# Patient Record
Sex: FEMALE | Employment: OTHER | URBAN - METROPOLITAN AREA
[De-identification: names, ages, dates, MRNs, and addresses within clinical notes are randomized per-mention and may not be internally consistent; named-entity substitution may affect disease eponyms.]

---

## 2017-04-06 NOTE — PATIENT DISCUSSION
(H25.13) Age-related nuclear cataract, bilateral - Assesment : Examination revealed cataract. Mild symptoms. S/P Monovision LASIK - Plan : SMonitor for changes. Advised patient to call our office with decreased vision or increased symptoms. Updated GLRx given today.   RTC in 1 year EXAM/MAC OCT

## 2017-04-06 NOTE — PATIENT DISCUSSION
(H35.371) Puckering of macula, right eye - Assesment : Examination revealed ERM, OCT flat OU. - Plan : Monitor. Advised patient to call our office with decreased vision or increased symptoms. RTC  in 1 year for exam and  MAC OCT, sooner for problems.

## 2019-07-22 ENCOUNTER — CONSULTATION (OUTPATIENT)
Dept: URBAN - METROPOLITAN AREA CLINIC 18 | Facility: CLINIC | Age: 64
End: 2019-07-22

## 2019-07-22 VITALS — SYSTOLIC BLOOD PRESSURE: 130 MMHG | HEIGHT: 55 IN | DIASTOLIC BLOOD PRESSURE: 90 MMHG

## 2019-07-22 NOTE — PATIENT DISCUSSION
I explained to the patient that they have a branch retinal vein occlusion, which is a blockage in one of the small veins in the back of the eye. Discussed the importance of blood pressure control in the prevention of ocular complications.

## 2019-07-22 NOTE — PATIENT DISCUSSION
Based on today’s exam and diagnostic studies, the determination was made for Avastin injection treatment. We will contact the patient's insurance company to do the precertification for the injection.

## 2019-07-24 ASSESSMENT — VISUAL ACUITY
OS_CC: 20/20
OD_CC: 20/30

## 2019-07-24 ASSESSMENT — TONOMETRY
OD_IOP_MMHG: 17
OS_IOP_MMHG: 20

## 2019-11-25 ENCOUNTER — FOLLOW UP (OUTPATIENT)
Dept: URBAN - METROPOLITAN AREA CLINIC 18 | Facility: CLINIC | Age: 64
End: 2019-11-25

## 2019-11-25 NOTE — PATIENT DISCUSSION
No treatment needed today. I will see her again in 2  months for re-evaluation with the possibility of an injection.

## 2019-12-02 ASSESSMENT — VISUAL ACUITY
OS_CC: 20/20-1
OD_CC: 20/25+2

## 2019-12-02 ASSESSMENT — TONOMETRY: OD_IOP_MMHG: 15

## 2020-01-27 ENCOUNTER — FOLLOW UP (OUTPATIENT)
Dept: URBAN - METROPOLITAN AREA CLINIC 18 | Facility: CLINIC | Age: 65
End: 2020-01-27

## 2020-01-27 NOTE — PROCEDURE NOTE: CLINICAL
PROCEDURE NOTE: Avastin () #4 OD. Diagnosis: Branch Retinal Vein Occlusion with Macular Edema. Anesthesia: Topical/Subconjunctival. Prep: Betadine Drops and Betadine Scrub. Intravitreal injection of Avastin 1.25mg/0.05 ml was given. Injection site: 3-4 mm from the limbus. Patient tolerated procedure well. CF vision checked. There were no complications. Post-op instructions given. Lot #: [id number]. Expiration Date: 3/3/2020. Inventory Id: gokul Castelan

## 2020-01-27 NOTE — PATIENT DISCUSSION
I have recommended that she have an injection of Avastin today in the right eye while she is here. I would like to see her on a monthly basis for 2 more. I will likely recommend Focal laser in the future.

## 2020-01-29 ASSESSMENT — TONOMETRY
OD_IOP_MMHG: 16
OS_IOP_MMHG: 16

## 2020-01-29 ASSESSMENT — VISUAL ACUITY
OD_CC: 20/25
OS_CC: 20/20

## 2020-05-11 ENCOUNTER — FOLLOW UP (OUTPATIENT)
Dept: URBAN - METROPOLITAN AREA CLINIC 18 | Facility: CLINIC | Age: 65
End: 2020-05-11

## 2020-05-12 ASSESSMENT — TONOMETRY: OD_IOP_MMHG: 15

## 2020-05-12 ASSESSMENT — VISUAL ACUITY
OS_CC: 20/20
OD_CC: 20/25-1

## 2020-07-27 ENCOUNTER — FOLLOW UP (OUTPATIENT)
Dept: URBAN - METROPOLITAN AREA CLINIC 18 | Facility: CLINIC | Age: 65
End: 2020-07-27

## 2020-07-27 NOTE — PATIENT DISCUSSION
Avastin #7 recommended today for the right eye while she is here today. I have recommended that she return in 1 month for focal laser.

## 2020-07-27 NOTE — PROCEDURE NOTE: CLINICAL
PROCEDURE NOTE: Avastin () #7 OD. Diagnosis: Branch Retinal Vein Occlusion with Macular Edema. Anesthesia: Topical/Subconjunctival. Prep: Betadine Drops and Betadine Scrub. Intravitreal injection of Avastin 1.25mg/0.05 ml was given. Injection site: 3-4 mm from the limbus. Patient tolerated procedure well. CF vision checked. There were no complications. Post-op instructions given. Lot #: [id number]. Expiration Date: 9/15/2020. Inventory Id: gokul Hurley

## 2020-07-29 ASSESSMENT — TONOMETRY
OD_IOP_MMHG: 10
OS_IOP_MMHG: 12

## 2020-07-29 ASSESSMENT — VISUAL ACUITY
OD_CC: 20/20-1
OS_CC: 20/20-1

## 2020-11-23 ENCOUNTER — FOLLOW UP (OUTPATIENT)
Dept: URBAN - METROPOLITAN AREA CLINIC 18 | Facility: CLINIC | Age: 65
End: 2020-11-23

## 2020-11-23 NOTE — PATIENT DISCUSSION
Mrs. Colby Bradley has responded well and no treatment is needed today. I will see her in 2 months for re-evaluation. [No Acute Distress] : no acute distress [Well Nourished] : well nourished [Well Developed] : well developed [Well-Appearing] : well-appearing [Normal Sclera/Conjunctiva] : normal sclera/conjunctiva [PERRL] : pupils equal round and reactive to light [EOMI] : extraocular movements intact [Normal Outer Ear/Nose] : the outer ears and nose were normal in appearance [Normal Oropharynx] : the oropharynx was normal [No JVD] : no jugular venous distention [No Lymphadenopathy] : no lymphadenopathy [Supple] : supple [No Respiratory Distress] : no respiratory distress  [Thyroid Normal, No Nodules] : the thyroid was normal and there were no nodules present [No Accessory Muscle Use] : no accessory muscle use [Clear to Auscultation] : lungs were clear to auscultation bilaterally [Normal Rate] : normal rate  [Regular Rhythm] : with a regular rhythm [Normal S1, S2] : normal S1 and S2 [No Murmur] : no murmur heard [No Carotid Bruits] : no carotid bruits [No Abdominal Bruit] : a ~M bruit was not heard ~T in the abdomen [No Varicosities] : no varicosities [Pedal Pulses Present] : the pedal pulses are present [No Edema] : there was no peripheral edema [No Palpable Aorta] : no palpable aorta [No Extremity Clubbing/Cyanosis] : no extremity clubbing/cyanosis [Soft] : abdomen soft [Non Tender] : non-tender [Non-distended] : non-distended [No Masses] : no abdominal mass palpated [No HSM] : no HSM [Normal Bowel Sounds] : normal bowel sounds [Normal Posterior Cervical Nodes] : no posterior cervical lymphadenopathy [Normal Anterior Cervical Nodes] : no anterior cervical lymphadenopathy [No CVA Tenderness] : no CVA  tenderness [No Spinal Tenderness] : no spinal tenderness [No Joint Swelling] : no joint swelling [Grossly Normal Strength/Tone] : grossly normal strength/tone [No Rash] : no rash [Coordination Grossly Intact] : coordination grossly intact [No Focal Deficits] : no focal deficits [Normal Gait] : normal gait [Deep Tendon Reflexes (DTR)] : deep tendon reflexes were 2+ and symmetric [Normal Affect] : the affect was normal [Normal Insight/Judgement] : insight and judgment were intact

## 2020-11-25 ASSESSMENT — VISUAL ACUITY
OS_CC: 20/25+2
OD_CC: 20/25-2

## 2020-11-25 ASSESSMENT — TONOMETRY
OS_IOP_MMHG: 17
OD_IOP_MMHG: 14

## 2021-01-25 ENCOUNTER — FOLLOW UP (OUTPATIENT)
Dept: URBAN - METROPOLITAN AREA CLINIC 18 | Facility: CLINIC | Age: 66
End: 2021-01-25

## 2021-01-27 ASSESSMENT — VISUAL ACUITY
OD_PH: 20/25-1
OD_CC: 20/30-2
OS_CC: 20/25+1

## 2021-01-27 ASSESSMENT — TONOMETRY
OS_IOP_MMHG: 13
OD_IOP_MMHG: 14

## 2021-06-14 ENCOUNTER — FOLLOW UP (OUTPATIENT)
Dept: URBAN - METROPOLITAN AREA CLINIC 18 | Facility: CLINIC | Age: 66
End: 2021-06-14

## 2021-06-14 DIAGNOSIS — H43.813: ICD-10-CM

## 2021-06-14 DIAGNOSIS — H34.8310: ICD-10-CM

## 2021-06-14 DIAGNOSIS — H35.362: ICD-10-CM

## 2021-06-14 PROCEDURE — 92134 CPTRZ OPH DX IMG PST SGM RTA: CPT

## 2021-06-14 PROCEDURE — 99214 OFFICE O/P EST MOD 30 MIN: CPT

## 2021-06-14 NOTE — PATIENT DISCUSSION
35 minutes spent in face to face dialogue with Mrs. iPper Jeong. Her retinal condition remains stable and I have asked that she return on an as needed basis. She is to call if she has any problems or concerns.

## 2021-06-16 ASSESSMENT — VISUAL ACUITY
OS_CC: 20/20-2
OD_CC: 20/30-2

## 2021-06-16 ASSESSMENT — TONOMETRY
OS_IOP_MMHG: 17
OD_IOP_MMHG: 18

## 2022-07-02 RX ORDER — ASPIRIN 81 MG/1
TABLET, CHEWABLE ORAL
COMMUNITY

## 2022-07-02 RX ORDER — BRIMONIDINE TARTRATE 0.1 %
DROPS OPHTHALMIC (EYE)
COMMUNITY

## 2022-07-02 RX ORDER — MECLIZINE HYDROCHLORIDE 25 MG/1
TABLET ORAL
COMMUNITY